# Patient Record
Sex: MALE | Race: BLACK OR AFRICAN AMERICAN | Employment: UNEMPLOYED | URBAN - METROPOLITAN AREA
[De-identification: names, ages, dates, MRNs, and addresses within clinical notes are randomized per-mention and may not be internally consistent; named-entity substitution may affect disease eponyms.]

---

## 2023-02-12 ENCOUNTER — HOSPITAL ENCOUNTER (EMERGENCY)
Age: 58
Discharge: HOME | End: 2023-02-12
Attending: EMERGENCY MEDICINE | Admitting: EMERGENCY MEDICINE

## 2023-02-12 VITALS
HEIGHT: 73 IN | HEART RATE: 62 BPM | TEMPERATURE: 98.3 F | RESPIRATION RATE: 12 BRPM | OXYGEN SATURATION: 98 % | WEIGHT: 226.3 LBS | DIASTOLIC BLOOD PRESSURE: 94 MMHG | SYSTOLIC BLOOD PRESSURE: 159 MMHG | BODY MASS INDEX: 29.99 KG/M2

## 2023-02-12 DIAGNOSIS — T40.1X1A ACCIDENTAL OVERDOSE OF HEROIN, INITIAL ENCOUNTER (HCC): Primary | ICD-10-CM

## 2023-02-12 PROCEDURE — 99283 EMERGENCY DEPT VISIT LOW MDM: CPT | Performed by: EMERGENCY MEDICINE

## 2023-02-12 ASSESSMENT — PAIN - FUNCTIONAL ASSESSMENT: PAIN_FUNCTIONAL_ASSESSMENT: 0-10

## 2023-02-12 ASSESSMENT — PAIN SCALES - GENERAL: PAINLEVEL_OUTOF10: 0

## 2023-02-12 ASSESSMENT — LIFESTYLE VARIABLES
HOW MANY STANDARD DRINKS CONTAINING ALCOHOL DO YOU HAVE ON A TYPICAL DAY: PATIENT DOES NOT DRINK
HOW OFTEN DO YOU HAVE A DRINK CONTAINING ALCOHOL: NEVER

## 2023-02-12 ASSESSMENT — ENCOUNTER SYMPTOMS
COLOR CHANGE: 0
SHORTNESS OF BREATH: 0
COUGH: 0

## 2023-02-12 NOTE — DISCHARGE INSTRUCTIONS
You have been given a prescription for a medicine called Narcan. This medicine counteract the effects of opiates. It is very important for you to teach your family how to use it and make them aware of where you keep the medication as it can be lifesaving if you accidentally overdose again. Follow up with [facility], for substance abuse help. Josiah Dacosta   [phone]   They have multiple resources to help you. Please call.  www[facility]. org     Other local resources that are available are: The [address]  [facility] for inpatient and outpatient substance abuse issues. [place] [phone]  Medication assisted treatment    [address] [place] [address] Drive  [phone]     Suicide Hotline   2-614-ADQQXPC     Narcotics Anonymous   www.na. org  Alcoholics Anonymous  www.aa.org

## 2023-02-12 NOTE — ED PROVIDER NOTES
Emergency Department Provider Note                   PCP:                No primary care provider on file. Age: 61 y.o. Sex: male     No diagnosis found. DISPOSITION          Medical Decision Making  Afia Licona think he needs any acute intervention at this time. He is awake and alert with normal vital signs. I will observe to ensure that he does not rebound to become somnolent again. Independent history was obtained from the wife and from EMS. ED Course as of 02/12/23 1603   Sun Feb 12, 2023   1602 Patient is doing well. He is awake and alert. I do not think he needs any acute intervention at this time and I will discharge him home. Discussed with him and his family the importance of Narcan and contacting favor. [AC]      ED Course User Index  [AC] Les Kee MD        No orders of the defined types were placed in this encounter. Medications - No data to display    New Prescriptions    No medications on file        Bhavani Beaver is a 61 y.o. male who presents to the Emergency Department with chief complaint of    Chief Complaint   Patient presents with    Drug Overdose      50-year-old gentleman presents with concerns about accidentally overdosing on heroin. He says he got it from a new provider and is unsure if it was stronger laced with anything. He says he was not trying to hurt himself or kill himself. He thinks he did do heroin around 2:00. EMS was called and they reported that family had done some CPR. On EMS arrival he was more alert and they gave Narcan and he returned to baseline. Patient says he currently feels fine and has no symptoms    Elements of this note were created using speech recognition software. As such, errors of speech recognition may be present. Review of Systems   Constitutional:  Negative for chills and fever. Respiratory:  Negative for cough and shortness of breath.     Skin:  Negative for color change and wound.   Neurological:  Negative for dizziness and headaches. No past medical history on file. No past surgical history on file. No family history on file. Patient has no known allergies. Previous Medications    No medications on file        Vitals signs and nursing note reviewed. Patient Vitals for the past 4 hrs:   Temp Pulse Resp BP SpO2   02/12/23 1459 98.3 °F (36.8 °C) 80 14 (!) 165/92 99 %          Physical Exam  Vitals and nursing note reviewed. Constitutional:       Appearance: Normal appearance. HENT:      Head: Normocephalic and atraumatic. Cardiovascular:      Rate and Rhythm: Normal rate and regular rhythm. Pulmonary:      Effort: Pulmonary effort is normal.      Breath sounds: Normal breath sounds. Neurological:      General: No focal deficit present. Mental Status: He is alert and oriented to person, place, and time. Procedures    No results found for any visits on 02/12/23. No orders to display                       Voice dictation software was used during the making of this note. This software is not perfect and grammatical and other typographical errors may be present. This note has not been completely proofread for errors.         Maria Dolores Jo MD  02/12/23 [id number]       Maria Dolores Jo MD  02/12/23 [id number]

## 2023-02-12 NOTE — ED TRIAGE NOTES
Pt arrives via [facility] from home. Pt was at home and became \"very rigid and slid off of the chair. \" Baldo Pacheco reports he was not responsive and they started CPR reportedly. Per EMS, as soon as they walked in and started talking to the pt he sat straight up. Pt did not have any complaints at the time. Pt had pinpoint pupils and was given 0.5mg of narcan, respiratory rate increased from 6 to 16. Pt was also given 4mg of zofran. Pt adds valuable information towards end of triage, he used heroin about one hour prior to arrival to the facility. Pt states that he got the heroin from a new person, he snorts the heroin. Pt unknown if the heroin was laced with any other drugs.